# Patient Record
Sex: MALE | Race: WHITE | NOT HISPANIC OR LATINO | Employment: FULL TIME | URBAN - METROPOLITAN AREA
[De-identification: names, ages, dates, MRNs, and addresses within clinical notes are randomized per-mention and may not be internally consistent; named-entity substitution may affect disease eponyms.]

---

## 2022-01-31 ENCOUNTER — NURSE TRIAGE (OUTPATIENT)
Dept: NURSING | Facility: CLINIC | Age: 22
End: 2022-01-31

## 2022-02-01 NOTE — TELEPHONE ENCOUNTER
Patient called.  He might have sprained his ankle.  He was playing [employer] and twisted it.  It is pretty swollen.  Patient will call Cristina in the morning to be seen but would like to know what to do tonight.    Patient denies any active bleeding, skin isnt broken.  It doesn't look dislocated, just swollen.  Patient denies numbness in his toes.  He is able to move it but it is painful.    Gave care advise for tonight: Follow RICE.  Rest, Ice, compression and elevation.  Also to take Ibuprofen 400 mg every 6 hours.    Lindsay Reyes RN   01/31/22 11:52 PM  [facility] Nurse Advisor        Reason for Disposition    [1] Limp when walking AND [2] due to a twisted ankle or foot    Additional Information    Negative: Serious injury with multiple fractures (broken bones)    Negative: [1] Major bleeding (e.g., actively dripping or spurting) AND [2] can't be stopped    Negative: Amputation    Negative: Looks like a dislocated joint (very crooked or deformed)    Negative: Sounds like a life-threatening emergency to the triager    Negative: Wound looks infected    Negative: Caused by an animal bite    Negative: Caused by a human bite    Negative: Puncture wound of foot    Negative: Toe injury is main concern    Negative: Cast problems or questions    Negative: Bullet wound, stabbed by knife, or other serious penetrating wound    Negative: Skin is split open or gaping (or length > 1/2 inch or 12 mm)    Negative: [1] Bleeding AND [2] won't stop after 10 minutes of direct pressure (using correct technique)    Negative: [1] Dirt in the wound AND [2] not removed with 15 minutes of scrubbing    Negative: Can't stand (bear weight) or walk    Negative: [1] Numbness (new loss of sensation) of toe(s) AND [2] present now    Negative: Sounds like a serious injury to the triager    Negative: [1] SEVERE pain AND [2] not improved 2 hours after pain medicine/ice packs    Negative: Suspicious history for the injury    Protocols used:  ANKLE AND FOOT INJURY-A-AH

## 2023-10-21 ENCOUNTER — HOSPITAL ENCOUNTER (EMERGENCY)
Facility: HOSPITAL | Age: 23
Discharge: HOME | End: 2023-10-21
Attending: STUDENT IN AN ORGANIZED HEALTH CARE EDUCATION/TRAINING PROGRAM | Admitting: STUDENT IN AN ORGANIZED HEALTH CARE EDUCATION/TRAINING PROGRAM
Payer: COMMERCIAL

## 2023-10-21 VITALS
DIASTOLIC BLOOD PRESSURE: 62 MMHG | HEART RATE: 69 BPM | BODY MASS INDEX: 17.88 KG/M2 | TEMPERATURE: 97.5 F | HEIGHT: 71 IN | SYSTOLIC BLOOD PRESSURE: 145 MMHG | WEIGHT: 127.7 LBS | OXYGEN SATURATION: 98 % | RESPIRATION RATE: 16 BRPM

## 2023-10-21 DIAGNOSIS — R11.2 NAUSEA AND VOMITING, UNSPECIFIED VOMITING TYPE: ICD-10-CM

## 2023-10-21 DIAGNOSIS — K29.20 ACUTE ALCOHOLIC GASTRITIS WITHOUT HEMORRHAGE: ICD-10-CM

## 2023-10-21 PROBLEM — L70.9 ACNE: Status: ACTIVE | Noted: 2023-10-21

## 2023-10-21 PROBLEM — J45.909 ASTHMA: Status: ACTIVE | Noted: 2023-10-21

## 2023-10-21 LAB
ALBUMIN SERPL BCG-MCNC: 5 G/DL (ref 3.5–5.2)
ALP SERPL-CCNC: 49 U/L (ref 40–129)
ALT SERPL W P-5'-P-CCNC: 17 U/L (ref 0–70)
ANION GAP SERPL CALCULATED.3IONS-SCNC: 14 MMOL/L (ref 7–15)
AST SERPL W P-5'-P-CCNC: 24 U/L (ref 0–45)
BILIRUB DIRECT SERPL-MCNC: 0.23 MG/DL (ref 0–0.3)
BILIRUB SERPL-MCNC: 0.9 MG/DL
BUN SERPL-MCNC: 19.7 MG/DL (ref 6–20)
CALCIUM SERPL-MCNC: 9.8 MG/DL (ref 8.6–10)
CHLORIDE SERPL-SCNC: 106 MMOL/L (ref 98–107)
CREAT SERPL-MCNC: 0.67 MG/DL (ref 0.67–1.17)
DEPRECATED HCO3 PLAS-SCNC: 22 MMOL/L (ref 22–29)
EGFRCR SERPLBLD CKD-EPI 2021: >90 ML/MIN/1.73M2
GLUCOSE SERPL-MCNC: 86 MG/DL (ref 70–99)
HOLD SPECIMEN: NORMAL
LIPASE SERPL-CCNC: 21 U/L (ref 13–60)
POTASSIUM SERPL-SCNC: 4.2 MMOL/L (ref 3.4–5.3)
PROT SERPL-MCNC: 7.1 G/DL (ref 6.4–8.3)
SODIUM SERPL-SCNC: 142 MMOL/L (ref 135–145)

## 2023-10-21 PROCEDURE — 99284 EMERGENCY DEPT VISIT MOD MDM: CPT | Mod: 25

## 2023-10-21 PROCEDURE — 80053 COMPREHEN METABOLIC PANEL: CPT

## 2023-10-21 PROCEDURE — 250N000011 HC RX IP 250 OP 636: Mod: JZ

## 2023-10-21 PROCEDURE — 83690 ASSAY OF LIPASE: CPT

## 2023-10-21 PROCEDURE — 258N000003 HC RX IP 258 OP 636

## 2023-10-21 PROCEDURE — 96361 HYDRATE IV INFUSION ADD-ON: CPT

## 2023-10-21 PROCEDURE — 96374 THER/PROPH/DIAG INJ IV PUSH: CPT

## 2023-10-21 PROCEDURE — 82248 BILIRUBIN DIRECT: CPT

## 2023-10-21 PROCEDURE — 96375 TX/PRO/DX INJ NEW DRUG ADDON: CPT

## 2023-10-21 PROCEDURE — 36415 COLL VENOUS BLD VENIPUNCTURE: CPT

## 2023-10-21 RX ORDER — ONDANSETRON 4 MG/1
4 TABLET, ORALLY DISINTEGRATING ORAL EVERY 8 HOURS PRN
Qty: 20 TABLET | Refills: 0 | Status: SHIPPED | OUTPATIENT
Start: 2023-10-21

## 2023-10-21 RX ORDER — ONDANSETRON 2 MG/ML
4 INJECTION INTRAMUSCULAR; INTRAVENOUS EVERY 30 MIN PRN
Status: DISCONTINUED | OUTPATIENT
Start: 2023-10-21 | End: 2023-10-21 | Stop reason: HOSPADM

## 2023-10-21 RX ADMIN — FAMOTIDINE 20 MG: 10 INJECTION, SOLUTION INTRAVENOUS at 16:17

## 2023-10-21 RX ADMIN — SODIUM CHLORIDE 1000 ML: 9 INJECTION, SOLUTION INTRAVENOUS at 16:09

## 2023-10-21 RX ADMIN — ONDANSETRON 4 MG: 2 INJECTION INTRAMUSCULAR; INTRAVENOUS at 16:10

## 2023-10-21 ASSESSMENT — ACTIVITIES OF DAILY LIVING (ADL): ADLS_ACUITY_SCORE: 35

## 2023-10-21 NOTE — ED PROVIDER NOTES
Emergency Department Encounter   NAME: Michael Drake ; AGE: 23 year old male ; YOB: 2000 ; MRN: [id number] ; PCP: No primary care provider on file.   ED PROVIDER: Amber Ceballos PA-C    Evaluation Date & Time:   10/21/2023  3:33 PM    CHIEF COMPLAINT:  Vomiting      Impression and Plan   Medical Decision Making    Michael Drake is a 23 year old male who presents for evaluation of vomiting x 1 day.  States that he was out drinking all night and woke up vomiting and has not been able to keep anything down.  Denies recent travel, illness, or sick contacts.  Denies any other symptoms including abdominal pain chest pain shortness of breath cough fever chills etc.     On my initial evaluation, vital signs show /72 but afebrile, SPO2 95%.  Normal sinus rhythm. On physical exam, he is actively throwing up in the room clear liquid without hematemesis/coffee ground appearance. No tenderness to palpation of abdomen. Lungs CTA. Mucous membranes moist. No skin tenting.     Differential diagnosis includes vomiting secondary to excess alcohol use, pancreatitis, gastritis, etc.. Emergency department workup included BMP, hepatic panel, lipase, and fluids/anti nausea meds. Patient was given zofran and pepcid with resolution in symptoms.  Lab work was unremarkable.  I have low suspicion for pancreatitis.  This is likely alcohol induced gastritis and nausea and vomiting associated.  Will send home with prescription for Zofran.     Patient was discharged in stable condition with treatment plan as below. Instructed to follow up with primary care provider as needed for reevaluation and return to the emergency department with any new or worsening of symptoms. Patient expressed understanding, feels comfortable, and is in agreement with this plan. All questions addressed prior to discharge.    History:  Supplemental history from: Documented in chart, if applicable and N/A  External Record(s) reviewed: Documented in  chart, if applicable. and Inpatient Record: Was seen in the ED on 8/25/2022 for vomiting after a night of drinking.  Symptoms were vomiting, headache, nausea but denied abdominal pain, chest pain, fevers.  Presented similar to how he is today    Work Up:  Chart documentation includes differential considered and any EKGs or imaging independently interpreted by provider, where specified.  In additional to work up documented, I considered the following work up: n/a    External consultation:  Discussion of management with another provider: N/A    Complicating factors:  Care impacted by chronic illness: n/a  Care affected by social determinants of health: n/a    Disposition considerations: Discharge. I prescribed additional prescription strength medication(s) as charted. See documentation for any additional details.    ED COURSE:  1541 I met and introduced myself to the patient. I gathered initial history and performed my physical exam. We discussed plan for initial workup.   1545 I have staffed the patient with Dr. Shakeel Melissa, ED MD, who has evaluated the patient and agrees with all aspects of today's care.     ED Course as of 10/21/23 1930   Sat Oct 21, 2023   1801 Lab Work unremarkable   1805 Patient feels much better after fluids and nausea meds.  Was able to tolerate p.o. challenge successfully.        FINAL IMPRESSION:    ICD-10-CM    1. Nausea and vomiting, unspecified vomiting type  R11.2       2. Acute alcoholic gastritis without hemorrhage  K29.20           MEDICATIONS GIVEN IN THE EMERGENCY DEPARTMENT:  Medications   ondansetron (ZOFRAN) injection 4 mg (4 mg Intravenous $Given 10/21/23 1610)   sodium chloride 0.9% BOLUS 1,000 mL (0 mLs Intravenous Stopped 10/21/23 1737)   famotidine (PEPCID) injection 20 mg (20 mg Intravenous $Given 10/21/23 1617)       NEW PRESCRIPTIONS STARTED AT TODAY'S ED VISIT:  Discharge Medication List as of 10/21/2023  6:21 PM        START taking these medications    Details  "  ondansetron (ZOFRAN ODT) 4 MG ODT tab Take 1 tablet (4 mg) by mouth every 8 hours as needed for nausea or vomiting, Disp-20 tablet, R-0, Local Print             HPI   Patient information was obtained from: patient   Use of Intrepreter: N/A     Michael Drake is a 23 year old male with a pertinent history of appendectomy who presents to the ED by foot for evaluation of nausea and vomiting since this morning.  States he went out partying last night with friends.  Threw up 1 time last night before bed and woke up this morning at 11 and has been throwing up constantly since.  Has not been able to keep down anything including water, food, ibuprofen.  Denies sick contacts, anyone with similar symptoms, recent travel,.  History of appendectomy.      Denies fevers, chills, diarrhea, hematemesis, coffee-ground emesis, flank pain, abdominal pain, dysuria, hematuria, back pain, chest pain, shortness of breath, upper respiratory infection symptoms    Medical History     No past medical history on file.    No past surgical history on file.    No family history on file.         ondansetron (ZOFRAN ODT) 4 MG ODT tab        Physical Exam     First Vitals:  Patient Vitals for the past 24 hrs:   BP Temp Temp src Pulse Resp SpO2 Height Weight   10/21/23 1815 (!) 145/62 -- -- 69 -- 98 % -- --   10/21/23 1800 (!) 144/69 -- -- 87 -- 98 % -- --   10/21/23 1531 (!) 157/72 97.5  F (36.4  C) Temporal 86 16 95 % -- --   10/21/23 1529 -- -- -- -- -- -- 1.803 m (5' 11\") 57.9 kg (127 lb 11.2 oz)       PHYSICAL EXAM:   Physical Exam  Constitutional:       Comments: Actively throwing up   HENT:      Mouth/Throat:      Mouth: Mucous membranes are moist.   Cardiovascular:      Rate and Rhythm: Normal rate and regular rhythm.   Pulmonary:      Effort: Pulmonary effort is normal.      Breath sounds: Normal breath sounds.   Abdominal:      General: Abdomen is flat.      Palpations: Abdomen is soft.      Tenderness: There is no abdominal tenderness. " There is no right CVA tenderness or left CVA tenderness.   Neurological:      Mental Status: He is alert.       Results     LAB:  All pertinent labs reviewed and interpreted  Labs Ordered and Resulted from Time of ED Arrival to Time of ED Departure   BASIC METABOLIC PANEL - Normal       Result Value    Sodium 142      Potassium 4.2      Chloride 106      Carbon Dioxide (CO2) 22      Anion Gap 14      Urea Nitrogen 19.7      Creatinine 0.67      GFR Estimate >90      Calcium 9.8      Glucose 86     LIPASE - Normal    Lipase 21     HEPATIC FUNCTION PANEL - Normal    Protein Total 7.1      Albumin 5.0      Bilirubin Total 0.9      Alkaline Phosphatase 49      AST 24      ALT 17      Bilirubin Direct 0.23         RADIOLOGY:  No orders to display         Amber Ceballos PA-C  Emergency Medicine   [facility] EMERGENCY DEPARTMENT       Amber Ceballos PA-C  10/21/23 1930

## 2023-10-21 NOTE — ED NOTES
Pt is noticeably better after admin of Zofran; pt is relaxed and the emesis bag is on the floor rather than in his hand. Pt stated that he was feeling much better, even asked for water which I deferred to the provider.

## 2023-10-21 NOTE — ED NOTES
Pt fluids complete; pt given food for PO challenge; has eaten almost everything now. No N/V noted at this time.

## 2023-10-21 NOTE — ED TRIAGE NOTES
Patient was out drinking last night and woke up this morning and has been having continuous vomiting. Patient is not a daily drinker. Feels that he is just hung over     Triage Assessment (Adult)       Row Name 10/21/23 3517          Triage Assessment    Airway WDL WDL        Skin Circulation/Temperature WDL    Skin Circulation/Temperature WDL WDL        Cardiac WDL    Cardiac WDL WDL        Peripheral/Neurovascular WDL    Peripheral Neurovascular WDL WDL        Cognitive/Neuro/Behavioral WDL    Cognitive/Neuro/Behavioral WDL WDL

## 2023-10-21 NOTE — DISCHARGE INSTRUCTIONS
You are seen in the emergency department today for nausea and vomiting.  Your lab work was unremarkable for electrolyte abnormalities, injury to your kidneys, or irritation of your pancreas.      You seemed to have significant improvement in symptoms after fluids and nausea medications.  You are able to tolerate food by mouth while in the ED.  The nausea and vomiting were likely secondary to alcohol.  It can cause irritation to the lining of the stomach results and nausea and vomiting. Sending you home with a prescription for antinausea medications which you can take as needed for nausea.    Please follow-up with your primary care provider for a routine visit.  Please return to the emergency department if you are having any chest pain, shortness of breath, fevers, nausea, vomiting, abdominal pain, shortness of breath, chest pain, or any other concerning symptoms.

## 2023-10-21 NOTE — ED PROVIDER NOTES
"Emergency Department Midlevel Supervisory Note     I personally saw the patient and performed a substantive portion of the visit including all aspects of the medical decision making.    ED Course:  3:45 PM Amber Ceballos PA-C staffed patient with me. I agree with their assessment and plan of management, and I will see the patient.  4:30 PM I met with the patient to introduce myself, gather additional history, perform my initial exam, and discuss the plan.     Brief HPI:     Michael Drake is a 23 year old male who presents for evaluation of vomiting.    The patient went out partying last night with friends and had one vomiting episode prior to heading to bed. He woke up around 11 AM this morning with nausea and reports vomiting continuously since. He has not been able to keep down anything. No recent sickness and recent travel. No other medical complaints or concerns at this time.    I, Jez Tapia, am serving as a scribe to document services personally performed by Shakeel Melissa MD, based on my observations and the provider's statements to me.   I, Shakeel Melissa MD attest that Jez Tapia was acting in a scribe capacity, has observed my performance of the services and has documented them in accordance with my direction.    Brief Physical Exam: BP (!) 157/72   Pulse 86   Temp 97.5  F (36.4  C) (Temporal)   Resp 16   Ht 1.803 m (5' 11\")   Wt 57.9 kg (127 lb 11.2 oz)   SpO2 95%   BMI 17.81 kg/m    Constitutional:  Alert, in no acute distress  EYES: Conjunctivae clear  HENT:  Atraumatic, normocephalic  Respiratory:  Respirations even, unlabored, in no acute respiratory distress  Cardiovascular:  Regular rate and rhythm, good peripheral perfusion  GI: Soft, nondistended, nontender, no palpable masses, no rebound, no guarding   Musculoskeletal:  No edema. No cyanosis. Range of motion major extremities intact.    Integument: Warm, Dry, No erythema, No rash.   Neurologic:  Alert & oriented, no focal deficits " noted  Psych: Normal mood and affect     MDM:  23-year-old male presents for evaluation of nausea, vomiting after a night of drinking tequila primarily.  Unable to tolerate liquids, though abdomen soft, nontender.  Patient treated conservatively with IV famotidine and Zofran as well as a 1 L fluid bolus and had dramatic improvement, as long as patient passes an oral challenge, can discharge home.    1. Nausea and vomiting, unspecified vomiting type    2. Acute alcoholic gastritis without hemorrhage        Shakeel Melissa M.D.  [facility] EMERGENCY DEPARTMENT  [address] [place] [id number]  [phone]       Shakeel Melissa MD  10/21/23 5962

## 2023-12-31 ENCOUNTER — HEALTH MAINTENANCE LETTER (OUTPATIENT)
Age: 23
End: 2023-12-31

## 2025-01-19 ENCOUNTER — HEALTH MAINTENANCE LETTER (OUTPATIENT)
Age: 25
End: 2025-01-19